# Patient Record
Sex: FEMALE
[De-identification: names, ages, dates, MRNs, and addresses within clinical notes are randomized per-mention and may not be internally consistent; named-entity substitution may affect disease eponyms.]

---

## 2020-09-08 ENCOUNTER — APPOINTMENT (OUTPATIENT)
Dept: VASCULAR SURGERY | Facility: CLINIC | Age: 40
End: 2020-09-08
Payer: COMMERCIAL

## 2020-09-08 DIAGNOSIS — I83.10 VARICOSE VEINS OF UNSPECIFIED LOWER EXTREMITY WITH INFLAMMATION: ICD-10-CM

## 2020-09-08 DIAGNOSIS — Z82.49 FAMILY HISTORY OF ISCHEMIC HEART DISEASE AND OTHER DISEASES OF THE CIRCULATORY SYSTEM: ICD-10-CM

## 2020-09-08 DIAGNOSIS — Z78.9 OTHER SPECIFIED HEALTH STATUS: ICD-10-CM

## 2020-09-08 PROBLEM — Z00.00 ENCOUNTER FOR PREVENTIVE HEALTH EXAMINATION: Status: ACTIVE | Noted: 2020-09-08

## 2020-09-08 PROCEDURE — 99203 OFFICE O/P NEW LOW 30 MIN: CPT

## 2020-09-08 PROCEDURE — 93970 EXTREMITY STUDY: CPT

## 2020-09-10 NOTE — ASSESSMENT
[FreeTextEntry1] : 40 year old F presents for initial consultation due to lower extremity varicose veins. \par Patient with moderate size varicose veins over R groin/labia area.\par Venous doppler is negative for reflux or DVT.\par No intervention is recommended at this time.\par Patient might come back in a fall/winter for laser for spider veins.\par Compression stockings were recommended, Rx was given.\par F/u as necessary. [Arterial/Venous Disease] : arterial/venous disease

## 2020-09-10 NOTE — PHYSICAL EXAM
[Normal Breath Sounds] : Normal breath sounds [Normal Heart Sounds] : normal heart sounds [Normal Rate and Rhythm] : normal rate and rhythm [2+] : left 2+ [Varicose Veins Of Lower Extremities] : bilaterally [Ankle Swelling On The Right] : mild [No Rash or Lesion] : No rash or lesion [Calm] : calm [Alert] : alert [JVD] : no jugular venous distention  [] : not present [Ankle Swelling (On Exam)] : not present [Abdomen Tenderness] : ~T ~M No abdominal tenderness [de-identified] : WN/WD, NAD [de-identified] : NC/AT [de-identified] : supple [de-identified] : +FROM throughout [de-identified] : + spider veins [de-identified] : grossly intact

## 2020-09-10 NOTE — REVIEW OF SYSTEMS
[Lower Ext Edema] : lower extremity edema [As Noted in HPI] : as noted in HPI [Limb Pain] : limb pain [Limb Swelling] : limb swelling [Negative] : Heme/Lymph [Leg Claudication] : no intermittent leg claudication

## 2020-09-10 NOTE — HISTORY OF PRESENT ILLNESS
[FreeTextEntry1] : 40 year old Hebrew speaking F presents for initial consultation due to lower extremity varicose veins. Patient states that she experiences LEs discomfort that is similar when she was pregnant and she wants to make sure she doesn't have any vascular issues. She noticed small varicose veins over her R calf, and her labia that are getting bigger. \par Patient denies hx of DVT/SVT, bleeding from her varicosities, claudication.\par